# Patient Record
Sex: MALE | Race: WHITE | Employment: FULL TIME | URBAN - NONMETROPOLITAN AREA
[De-identification: names, ages, dates, MRNs, and addresses within clinical notes are randomized per-mention and may not be internally consistent; named-entity substitution may affect disease eponyms.]

---

## 2019-04-11 ENCOUNTER — HOSPITAL ENCOUNTER (EMERGENCY)
Age: 19
Discharge: HOME | End: 2019-04-11
Attending: EMERGENCY MEDICINE
Payer: MEDICARE

## 2019-04-11 VITALS
OXYGEN SATURATION: 96 % | DIASTOLIC BLOOD PRESSURE: 70 MMHG | SYSTOLIC BLOOD PRESSURE: 139 MMHG | RESPIRATION RATE: 16 BRPM | WEIGHT: 160 LBS | HEART RATE: 70 BPM | TEMPERATURE: 97 F

## 2019-04-11 DIAGNOSIS — Z20.2 EXPOSURE TO STD: Primary | ICD-10-CM

## 2019-04-11 PROCEDURE — 6360000002 HC RX W HCPCS: Performed by: EMERGENCY MEDICINE

## 2019-04-11 PROCEDURE — 99283 EMERGENCY DEPT VISIT LOW MDM: CPT

## 2019-04-11 PROCEDURE — 96372 THER/PROPH/DIAG INJ SC/IM: CPT

## 2019-04-11 PROCEDURE — 6370000000 HC RX 637 (ALT 250 FOR IP): Performed by: EMERGENCY MEDICINE

## 2019-04-11 RX ORDER — AZITHROMYCIN 250 MG/1
1000 TABLET, FILM COATED ORAL ONCE
Status: COMPLETED | OUTPATIENT
Start: 2019-04-11 | End: 2019-04-11

## 2019-04-11 RX ORDER — CEFTRIAXONE 500 MG/1
250 INJECTION, POWDER, FOR SOLUTION INTRAMUSCULAR; INTRAVENOUS ONCE
Status: COMPLETED | OUTPATIENT
Start: 2019-04-11 | End: 2019-04-11

## 2019-04-11 RX ADMIN — AZITHROMYCIN 1000 MG: 250 TABLET, FILM COATED ORAL at 19:15

## 2019-04-11 RX ADMIN — CEFTRIAXONE SODIUM 250 MG: 500 INJECTION, POWDER, FOR SOLUTION INTRAMUSCULAR; INTRAVENOUS at 19:15

## 2019-04-11 SDOH — HEALTH STABILITY: MENTAL HEALTH: HOW OFTEN DO YOU HAVE A DRINK CONTAINING ALCOHOL?: NEVER

## 2019-04-11 ASSESSMENT — PAIN SCALES - GENERAL: PAINLEVEL_OUTOF10: 5

## 2019-04-11 NOTE — ED TRIAGE NOTES
Girlfriend recently tested positive for chlamydia. Pt is having painful urination and clear discharge.

## 2019-04-11 NOTE — ED PROVIDER NOTES
eMERGENCY dEPARTMENT eNCOUnter      Pt Name: Evan Ya  MRN: [id number]  [place] 2000  Date of evaluation: 4/11/2019      CHIEF COMPLAINT       Chief Complaint   Patient presents with    Exposure to STD         [facility]    Evan Ya is a 20 y.o. male who presents for STD exposure. Patient states off and on for last couple weeks has been having some dysuria occasionally with some discharge. She was treated for an STD back last September, October, time. Says his girlfriend was treated that time found to be clear. He was never tested after worse. He states that his girlfriend's retest recently was told that she had Chlamydia ceases been having some symptoms. He denies abdominal pain, nausea, vomiting, is here basically for treatment and not for testing         REVIEW OF SYSTEMS       Positive discharge. Positive dysuria. Negative for abdominal pain, fever, chills, nausea, vomiting     PAST MEDICAL HISTORY    has a past medical history of STD (male). SURGICAL HISTORY      has no past surgical history on file. CURRENT MEDICATIONS       Previous Medications    No medications on file       ALLERGIES     has No Known Allergies. FAMILY HISTORY     has no family status information on file. family history is not on file. SOCIAL HISTORY      reports that he has never smoked. He has never used smokeless tobacco. He reports that he has current or past drug history. He reports that he does not drink alcohol. PHYSICAL EXAM     INITIAL VITALS:  weight is 160 lb (72.6 kg). His tympanic temperature is 97 °F (36.1 °C). His blood pressure is 139/70 and his pulse is 70. His respiration is 16 and oxygen saturation is 96%. Gen.: Patient is well-hydrated, nontoxic-appearing male in no acute distress. HEENT: Head is atraumatic. Respiratory: Lung sounds are clear bilaterally. Cardiac: Heart is regular rate and rhythm.   GI: Abdomen soft, nontender    DIFFERENTIAL DIAGNOSIS/ MDM:     STD    DIAGNOSTIC RESULTS       RADIOLOGY:   I directly visualized the following  images and reviewed the radiologist interpretations:  No orders to display         LABS:  Labs Reviewed - No data to display      EMERGENCY DEPARTMENT COURSE:   Vitals:    Vitals:    04/11/19 1847   BP: 139/70   Pulse: 70   Resp: 16   Temp: 97 °F (36.1 °C)   TempSrc: Tympanic   SpO2: 96%   Weight: 160 lb (72.6 kg)     -------------------------  BP: 139/70, Temp: 97 °F (36.1 °C), Pulse: 70, Resp: 16    Orders Placed This Encounter   Medications    azithromycin (ZITHROMAX) tablet 1,000 mg    cefTRIAXone (ROCEPHIN) injection 250 mg           Re-evaluation Notes    Patient does not want testing. He simply wants treatment for chlamydia did tell him that since we were not testing him, I will treat her both for GC and chlamydia. He is agreeable to that is instructed follow-up as directed. Retesting. No intercourse until retested both by him and his partner. Return if worse        FINAL IMPRESSION      1. Exposure to STD          DISPOSITION/PLAN   DISPOSITION Decision To Discharge 04/11/2019 07:07:25 PM      Condition on Disposition    Stable    PATIENT REFERRED TO:  No follow-up provider specified. DISCHARGE MEDICATIONS:  New Prescriptions    No medications on file       (Please note that portions of this note were completed with a voice recognition program.  Efforts were made to edit the dictations but occasionally words are mis-transcribed.)    Monreal MD, F.A.C.E.P.   Attending Emergency Physician        Jake Conroy MD  04/11/19 [id number]